# Patient Record
Sex: MALE | URBAN - METROPOLITAN AREA
[De-identification: names, ages, dates, MRNs, and addresses within clinical notes are randomized per-mention and may not be internally consistent; named-entity substitution may affect disease eponyms.]

---

## 2023-07-19 ENCOUNTER — TELEPHONE (OUTPATIENT)
Dept: ORTHOPEDIC SURGERY | Age: 68
End: 2023-07-19

## 2023-07-19 NOTE — TELEPHONE ENCOUNTER
Orthopedic Nurse Navigator Summary    Patient Name:  Cindy Mattson  Anticipated Date of Surgery:  08/01/23  Attended Pre-op Education Class:  Video sent to patient email 07/05/23  PCP: None listed- christian [facility]   Date of PCP visit for H&P: left message he needs preop exam and labs- has an appointment next week for labs and [facility]  Is patient in a Pain Management program:  Review of Medical history reveals history of:     Critical Lab Values  - Hemoglobin (g/dL):  Date: Value   - Hematocrit(%): Date:   Value   - HgbA1C:  Date:  Value  - Albumin:  Date:   Value   - BUN:  Date:   Value   - Creatinine:  Date:   Value     Coronary Artery Disease/HTN/CHF history:   Cardiologist:  Cardiac clearance necessary:    Date of cardiac clearance appt:  Final Cardiac recommendations: On any anticoagulation: No    Diabetes History:    Most recent HgbA1C:  Pulmonary:  COPD/Emphysema/Use of home oxygen:  Alcohol use: Yes    BMI greater than 40 at time of scheduling: Additional medical concerns: Additional recommendations for above concerns:  Attended Pre-Hab program:  He is currently doing therapy  Anticipated Discharge Disposition:  Patient is requesting going to a rehab facility PO. He lives alone in [place], [place]. He has friends here in [place] and they can drive him to the surgery. He said he will drive down for the surgery and his friend can drive him home. He talked about potentially staying in a hotel because his home has a lot of stairs to navigate. Who will be with patient at home following discharge:  He lives alone and has limited help. We discussed that his insurance may deny a rehab facility and he needs to be prepared to go home. He is going to work on a plan for extra help PO.   Equipment patient already has:  walker, cane  Bedroom on first or second floor:  second- patient said he is basically living on the first floor of his home due to limited mobility  Bathroom on first